# Patient Record
Sex: FEMALE | URBAN - METROPOLITAN AREA
[De-identification: names, ages, dates, MRNs, and addresses within clinical notes are randomized per-mention and may not be internally consistent; named-entity substitution may affect disease eponyms.]

---

## 2018-03-26 ENCOUNTER — APPOINTMENT (RX ONLY)
Dept: URBAN - METROPOLITAN AREA CLINIC 91 | Facility: CLINIC | Age: 73
Setting detail: DERMATOLOGY
End: 2018-03-26

## 2018-03-26 DIAGNOSIS — D485 NEOPLASM OF UNCERTAIN BEHAVIOR OF SKIN: ICD-10-CM

## 2018-03-26 PROBLEM — J30.1 ALLERGIC RHINITIS DUE TO POLLEN: Status: ACTIVE | Noted: 2018-03-26

## 2018-03-26 PROBLEM — D48.5 NEOPLASM OF UNCERTAIN BEHAVIOR OF SKIN: Status: ACTIVE | Noted: 2018-03-26

## 2018-03-26 PROCEDURE — 11100: CPT

## 2018-03-26 PROCEDURE — ? COUNSELING

## 2018-03-26 PROCEDURE — ? BIOPSY BY SHAVE METHOD

## 2018-03-26 ASSESSMENT — LOCATION SIMPLE DESCRIPTION DERM: LOCATION SIMPLE: LEFT BUTTOCK

## 2018-03-26 ASSESSMENT — PAIN INTENSITY VAS: HOW INTENSE IS YOUR PAIN 0 BEING NO PAIN, 10 BEING THE MOST SEVERE PAIN POSSIBLE?: NO PAIN

## 2018-03-26 ASSESSMENT — LOCATION ZONE DERM: LOCATION ZONE: TRUNK

## 2018-03-26 ASSESSMENT — LOCATION DETAILED DESCRIPTION DERM: LOCATION DETAILED: LEFT BUTTOCK

## 2019-06-24 ENCOUNTER — APPOINTMENT (RX ONLY)
Dept: URBAN - METROPOLITAN AREA CLINIC 91 | Facility: CLINIC | Age: 74
Setting detail: DERMATOLOGY
End: 2019-06-24

## 2019-06-24 DIAGNOSIS — L91.8 OTHER HYPERTROPHIC DISORDERS OF THE SKIN: ICD-10-CM

## 2019-06-24 DIAGNOSIS — L82.0 INFLAMED SEBORRHEIC KERATOSIS: ICD-10-CM

## 2019-06-24 PROCEDURE — 99213 OFFICE O/P EST LOW 20 MIN: CPT

## 2019-06-24 PROCEDURE — ? ADDITIONAL NOTES

## 2019-06-24 ASSESSMENT — LOCATION DETAILED DESCRIPTION DERM
LOCATION DETAILED: LEFT INFERIOR LATERAL FOREHEAD
LOCATION DETAILED: RIGHT LATERAL UPPER BACK

## 2019-06-24 ASSESSMENT — LOCATION ZONE DERM
LOCATION ZONE: FACE
LOCATION ZONE: TRUNK

## 2019-06-24 ASSESSMENT — LOCATION SIMPLE DESCRIPTION DERM
LOCATION SIMPLE: RIGHT UPPER BACK
LOCATION SIMPLE: LEFT FOREHEAD

## 2019-06-24 NOTE — PROCEDURE: ADDITIONAL NOTES
Additional Notes: Treated with cryotherapy
Detail Level: Simple
Additional Notes: Treated with electro needle

## 2022-07-05 ENCOUNTER — APPOINTMENT (RX ONLY)
Dept: URBAN - METROPOLITAN AREA CLINIC 117 | Facility: CLINIC | Age: 77
Setting detail: DERMATOLOGY
End: 2022-07-05

## 2022-07-05 DIAGNOSIS — L82.1 OTHER SEBORRHEIC KERATOSIS: ICD-10-CM

## 2022-07-05 DIAGNOSIS — L91.8 OTHER HYPERTROPHIC DISORDERS OF THE SKIN: ICD-10-CM

## 2022-07-05 PROCEDURE — 99202 OFFICE O/P NEW SF 15 MIN: CPT

## 2022-07-05 PROCEDURE — ? COUNSELING

## 2022-07-05 PROCEDURE — ? LIQUID NITROGEN

## 2022-07-05 PROCEDURE — ? INVENTORY

## 2022-07-05 ASSESSMENT — LOCATION DETAILED DESCRIPTION DERM
LOCATION DETAILED: RIGHT CLAVICULAR SKIN
LOCATION DETAILED: UPPER STERNUM
LOCATION DETAILED: RIGHT INFERIOR LATERAL NECK
LOCATION DETAILED: RIGHT CLAVICULAR NECK
LOCATION DETAILED: LEFT INFERIOR ANTERIOR NECK
LOCATION DETAILED: STERNAL NOTCH
LOCATION DETAILED: LEFT INFERIOR LATERAL NECK
LOCATION DETAILED: RIGHT LATERAL SUPERIOR CHEST

## 2022-07-05 ASSESSMENT — LOCATION ZONE DERM
LOCATION ZONE: NECK
LOCATION ZONE: TRUNK

## 2022-07-05 ASSESSMENT — LOCATION SIMPLE DESCRIPTION DERM
LOCATION SIMPLE: RIGHT CLAVICULAR SKIN
LOCATION SIMPLE: LEFT ANTERIOR NECK
LOCATION SIMPLE: CHEST
LOCATION SIMPLE: RIGHT ANTERIOR NECK

## 2022-07-05 NOTE — PROCEDURE: LIQUID NITROGEN
Medical Necessity Information: It is in your best interest to select a reason for this procedure from the list below. All of these items fulfill various CMS LCD requirements except the new and changing color options.
Medical Necessity Clause: This procedure was medically necessary because the lesions that were treated were:
Include Z78.9 (Other Specified Conditions Influencing Health Status) As An Associated Diagnosis?: No
Detail Level: Detailed
Spray Paint Text: The liquid nitrogen was applied to the skin utilizing a spray paint frosting technique.
Duration Of Freeze Thaw-Cycle (Seconds): 0
Consent: The patient's consent was obtained including but not limited to risks of crusting, scabbing, blistering, scarring, darker or lighter pigmentary change, recurrence, incomplete removal and infection.
Post-Care Instructions: I reviewed with the patient in detail post-care instructions. Patient is to wear sunprotection, and avoid picking at any of the treated lesions. Pt may apply Vaseline to crusted or scabbing areas.
Show Spray Paint Technique Variable?: Yes

## 2022-10-10 NOTE — PROCEDURE: BIOPSY BY SHAVE METHOD
Detail Level: Detailed
Hemostasis: Zia's
Lab Facility: [id number]
Silver Nitrate Text: The wound bed was treated with silver nitrate after the biopsy was performed.
Lab: [id number]
Notification Instructions: Patient will be notified of biopsy results. However, patient instructed to call the office if not contacted within 2 weeks.
Type Of Destruction Used: Curettage
Wound Care: Vaseline
Render Post-Care Instructions In Note?: yes
Curettage Text: The wound bed was treated with curettage after the biopsy was performed.
 used
Dressing: bandage
Size Of Lesion In Cm: 0
Biopsy Method: Dermablade
Destruction After The Procedure: No
Electrodesiccation And Curettage Text: The wound bed was treated with electrodesiccation and curettage after the biopsy was performed.
Consent: Verbal consent was obtained and risks were reviewed including but not limited to scarring, infection, bleeding, scabbing, incomplete removal, nerve damage and allergy to anesthesia.
Anesthesia Type: 1% lidocaine without epinephrine
Biopsy Type: H and E
Billing Type: Third-Party Bill
Post-Care Instructions: I reviewed with the patient in detail post-care instructions. Patient is to keep the biopsy site dry overnight, and then apply vaseline twice daily until healed. Patient may apply hydrogen peroxide soaks to remove any crusting.
Electrodesiccation Text: The wound bed was treated with electrodesiccation after the biopsy was performed.
Anesthesia Volume In Cc (Will Not Render If 0): 0.3
Cryotherapy Text: The wound bed was treated with cryotherapy after the biopsy was performed.

## 2023-01-22 ENCOUNTER — HOSPITAL ENCOUNTER (EMERGENCY)
Dept: HOSPITAL 92 - ERS | Age: 78
Discharge: HOME | End: 2023-01-22
Payer: MEDICARE

## 2023-01-22 DIAGNOSIS — I48.92: ICD-10-CM

## 2023-01-22 DIAGNOSIS — R00.2: Primary | ICD-10-CM

## 2023-01-22 DIAGNOSIS — Z79.82: ICD-10-CM

## 2023-01-22 DIAGNOSIS — Z79.01: ICD-10-CM

## 2023-01-22 DIAGNOSIS — I10: ICD-10-CM

## 2023-01-22 LAB
ALBUMIN SERPL BCG-MCNC: 4.5 G/DL (ref 3.4–4.8)
ALP SERPL-CCNC: 86 U/L (ref 40–110)
ALT SERPL W P-5'-P-CCNC: 27 U/L (ref 8–55)
ANION GAP SERPL CALC-SCNC: 16 MMOL/L (ref 10–20)
AST SERPL-CCNC: 23 U/L (ref 5–34)
BASOPHILS # BLD AUTO: 0 THOU/UL (ref 0–0.2)
BASOPHILS NFR BLD AUTO: 0.1 % (ref 0–1)
BILIRUB SERPL-MCNC: 0.7 MG/DL (ref 0.2–1.2)
BUN SERPL-MCNC: 23 MG/DL (ref 9.8–20.1)
CALCIUM SERPL-MCNC: 9.9 MG/DL (ref 7.8–10.44)
CHLORIDE SERPL-SCNC: 104 MMOL/L (ref 98–107)
CO2 SERPL-SCNC: 21 MMOL/L (ref 23–31)
CREAT CL PREDICTED SERPL C-G-VRATE: 0 ML/MIN (ref 70–130)
EOSINOPHIL # BLD AUTO: 0.1 THOU/UL (ref 0–0.7)
EOSINOPHIL NFR BLD AUTO: 0.9 % (ref 0–10)
GLOBULIN SER CALC-MCNC: 2.7 G/DL (ref 2.4–3.5)
GLUCOSE SERPL-MCNC: 130 MG/DL (ref 83–110)
HGB BLD-MCNC: 14.9 G/DL (ref 12–16)
LYMPHOCYTES # BLD: 1.8 THOU/UL (ref 1.2–3.4)
LYMPHOCYTES NFR BLD AUTO: 19 % (ref 21–51)
MCH RBC QN AUTO: 31.6 PG (ref 27–31)
MCV RBC AUTO: 89.4 FL (ref 78–98)
MONOCYTES # BLD AUTO: 0.4 THOU/UL (ref 0.11–0.59)
MONOCYTES NFR BLD AUTO: 4 % (ref 0–10)
NEUTROPHILS # BLD AUTO: 7.3 THOU/UL (ref 1.4–6.5)
NEUTROPHILS NFR BLD AUTO: 76 % (ref 42–75)
PLATELET # BLD AUTO: 222 10X3/UL (ref 130–400)
POTASSIUM SERPL-SCNC: 4.1 MMOL/L (ref 3.5–5.1)
RBC # BLD AUTO: 4.71 MILL/UL (ref 4.2–5.4)
SODIUM SERPL-SCNC: 137 MMOL/L (ref 136–145)
WBC # BLD AUTO: 9.6 10X3/UL (ref 4.8–10.8)

## 2023-01-22 PROCEDURE — 80053 COMPREHEN METABOLIC PANEL: CPT

## 2023-01-22 PROCEDURE — 71045 X-RAY EXAM CHEST 1 VIEW: CPT

## 2023-01-22 PROCEDURE — 84484 ASSAY OF TROPONIN QUANT: CPT

## 2023-01-22 PROCEDURE — 93005 ELECTROCARDIOGRAM TRACING: CPT

## 2023-01-22 PROCEDURE — 36415 COLL VENOUS BLD VENIPUNCTURE: CPT

## 2023-01-22 PROCEDURE — 96374 THER/PROPH/DIAG INJ IV PUSH: CPT

## 2023-01-22 PROCEDURE — 83880 ASSAY OF NATRIURETIC PEPTIDE: CPT

## 2023-01-22 PROCEDURE — 96375 TX/PRO/DX INJ NEW DRUG ADDON: CPT

## 2023-01-22 PROCEDURE — 85025 COMPLETE CBC W/AUTO DIFF WBC: CPT

## 2025-06-30 ENCOUNTER — HOSPITAL ENCOUNTER (EMERGENCY)
Dept: HOSPITAL 92 - ERS | Age: 80
Discharge: HOME | End: 2025-06-30
Payer: COMMERCIAL

## 2025-06-30 DIAGNOSIS — Z79.82: ICD-10-CM

## 2025-06-30 DIAGNOSIS — I48.91: Primary | ICD-10-CM

## 2025-06-30 DIAGNOSIS — I10: ICD-10-CM

## 2025-06-30 DIAGNOSIS — Z79.899: ICD-10-CM

## 2025-06-30 LAB
ALBUMIN SERPL BCG-MCNC: 4.1 G/DL (ref 3.1–4.5)
ALBUMIN/GLOB SERPL: 1.3 G/DL (ref 1.2–2.2)
ALP SERPL-CCNC: 129 U/L (ref 40–110)
ALT SERPL W P-5'-P-CCNC: 29 U/L (ref ?–34)
ANION GAP SERPL CALC-SCNC: 12 MMOL/L (ref 10–20)
ANISOCYTOSIS BLD QL SMEAR: (no result) (100X)
AST SERPL-CCNC: 35 U/L (ref 11–34)
AUER BODIES BLD QL SMEAR: (no result)
BASO STIPL BLD QL SMEAR: (no result) (100X)
BASOPHILS # BLD AUTO: 0.04 10X3/UL (ref 0–0.2)
BASOPHILS NFR BLD AUTO: 0.5 % (ref 0–1)
BASOPHILS NFR BLD MANUAL: (no result) % (ref 0–2)
BILIRUB SERPL-MCNC: 0.5 MG/DL (ref 0.3–1.2)
BITE CELLS BLD QL SMEAR: (no result) (100X)
BLASTS NFR BLD MANUAL: (no result) % (ref 0–0)
BLISTER CELLS BLD QL SMEAR: (no result) (100X)
BUN SERPL-MCNC: 16 MG/DL (ref 9.8–20.1)
BURR CELLS BLD QL SMEAR: (no result) (100X)
BURR CELLS BLD QL SMEAR: (no result) (100X)
CABOT RINGS BLD QL SMEAR: (no result) (100X)
CALCIUM SERPL-MCNC: 9.2 MG/DL (ref 7.8–10.44)
CELLAVISION OPERATOR ID: (no result)
CHLORIDE SERPL-SCNC: 109 MMOL/L (ref 98–107)
CO2 SERPL-SCNC: 25 MMOL/L (ref 23–31)
COMM CRITICAL RESULTS DOC: (no result)
CREAT CL PREDICTED SERPL C-G-VRATE: 0 ML/MIN (ref 70–130)
CREAT SERPL-MCNC: 0.71 MG/DL (ref 0.5–1.2)
DACRYOCYTES BLD QL SMEAR: (no result) (100X)
DELETE AUTO DIFF??: (no result)
DOHLE BOD BLD QL SMEAR: (no result)
EGFRCR SERPLBLD CKD-EPI 2021: 88 ML/MIN/{1.73_M2}
ELLIPTOCYTES BLD QL SMEAR: (no result) (100X)
EOSINOPHIL # BLD AUTO: 0.24 10X3/UL (ref 0–0.7)
EOSINOPHIL NFR BLD AUTO: 3.3 % (ref 0–10)
EOSINOPHIL NFR BLD MANUAL: (no result) % (ref 0–10)
ERYTHROCYTE [DISTWIDTH] IN BLOOD BY AUTOMATED COUNT: 13.1 % (ref 11.5–14.5)
GIANT PLATELETS BLD QL SMEAR: (no result) HPF (ref 0–5)
GLOBULIN SER CALC-MCNC: 3.1 G/DL (ref 2.4–3.5)
GLUCOSE SERPL-MCNC: 110 MG/DL (ref 83–110)
HCT VFR BLD CALC: 40.4 % (ref 36–47)
HELMET CELLS BLD QL SMEAR: (no result) (100X)
HGB BLD-MCNC: 13.8 G/DL (ref 12–16)
HOWELL-JOLLY BOD BLD QL SMEAR: (no result) (100X)
HYPOCHROMIA BLD QL SMEAR: (no result) (100X)
LG PLATELETS BLD QL SMEAR: (no result)
LYMPHOCYTES NFR BLD AUTO: 19.8 % (ref 21–51)
LYMPHOCYTES NFR BLD MANUAL: (no result) % (ref 21–51)
Lab: (no result) (100X)
MACROCYTES BLD QL SMEAR: (no result) (100X)
MANUAL DIF COMMENT BLD-IMP: (no result)
MANUAL DIF COMMENT BLD-IMP: (no result)
MANUAL DIFF??: (no result)
MCH RBC QN AUTO: 29.6 PG (ref 27–31)
MCHC RBC AUTO-ENTMCNC: 34.2 G/DL (ref 32–36)
MCV RBC AUTO: 86.5 FL (ref 78–98)
METAMYELOCYTES NFR BLD MANUAL: (no result) % (ref 0–0)
MICROCYTES BLD QL SMEAR: (no result) (100X)
MONOCYTES # BLD AUTO: 0.33 10X3/UL (ref 0.11–0.59)
MONOCYTES NFR BLD AUTO: 4.5 % (ref 0–10)
MONOCYTES NFR BLD MANUAL: (no result) % (ref 0–10)
MYELOCYTES NFR BLD MANUAL: (no result) % (ref 0–0)
NEUTROPHILS # BLD AUTO: 5.25 10X3/UL (ref 1.4–6.5)
NEUTROPHILS NFR BLD AUTO: 71.8 % (ref 42–75)
NEUTS BAND NFR BLD MANUAL: (no result) % (ref 5–11)
NEUTS HYPERSEG BLD QL SMEAR: (no result)
NEUTS SEG NFR BLD MANUAL: (no result) % (ref 42–75)
NRBC # BLD: (no result) %
OVALOCYTES BLD QL SMEAR: (no result) (100X)
PAPPENHEIMER BOD BLD QL SMEAR: (no result) (100X)
PLASMACOID LYMPHS NFR BLD MANUAL: (no result) % (ref 0–0)
PLATELET # BLD AUTO: 181 10X3/UL (ref 130–400)
PLATELET BLD QL SMEAR: (no result)
PLATELET CLUMP BLD QL SMEAR: (no result)
PLATELET SATEL BLD QL SMEAR: (no result)
PMV BLD AUTO: 8.9 FL (ref 7.4–10.4)
POIKILOCYTOSIS BLD QL SMEAR: (no result) (100X)
POLYCHROMASIA BLD QL SMEAR: (no result) (100X)
POTASSIUM SERPL-SCNC: 3.8 MMOL/L (ref 3.5–5.1)
PROMYELOCYTES NFR BLD MANUAL: (no result) % (ref 0–0)
PROT SERPL-MCNC: 7.2 G/DL (ref 5.8–8.1)
RBC # BLD AUTO: 4.67 MILL/UL (ref 4.2–5.4)
RBC MORPH BLD: (no result)
REFLEX FOR REVIEW??: (no result)
ROULEAUX BLD QL SMEAR: (no result) (100X)
SCHISTOCYTES BLD QL SMEAR: (no result) (100X)
SICKLE CELLS BLD QL SMEAR: (no result) (100X)
SMALL PLATELETS BLD QL SMEAR: (no result) HPF (ref 0–15)
SMUDGE CELLS BLD QL SMEAR: (no result)
SODIUM SERPL-SCNC: 142 MMOL/L (ref 136–145)
SPHEROCYTES BLD QL SMEAR: (no result) (100X)
STOMATOCYTES BLD QL SMEAR: (no result) (100X)
TARGETS BLD QL SMEAR: (no result) (100X)
TOTAL CELLS COUNTED BLD: (no result)
TOXIC GRANULES BLD QL SMEAR: (no result)
TROPONIN I SERPL DL<=0.01 NG/ML-MCNC: 0.01 NG/ML (ref ?–0.03)
VARIANT LYMPHS NFR BLD MANUAL: (no result) % (ref 0–10)
WBC # BLD AUTO: 7.32 10X3/UL (ref 4.8–10.8)
WBC OTHER NFR BLD MANUAL: (no result) %
WBC TOXIC VACUOLES BLD QL SMEAR: (no result)

## 2025-06-30 PROCEDURE — 85379 FIBRIN DEGRADATION QUANT: CPT

## 2025-06-30 PROCEDURE — 84484 ASSAY OF TROPONIN QUANT: CPT

## 2025-06-30 PROCEDURE — 83880 ASSAY OF NATRIURETIC PEPTIDE: CPT

## 2025-06-30 PROCEDURE — 85025 COMPLETE CBC W/AUTO DIFF WBC: CPT

## 2025-06-30 PROCEDURE — 96375 TX/PRO/DX INJ NEW DRUG ADDON: CPT

## 2025-06-30 PROCEDURE — 96374 THER/PROPH/DIAG INJ IV PUSH: CPT

## 2025-06-30 PROCEDURE — 84443 ASSAY THYROID STIM HORMONE: CPT

## 2025-06-30 PROCEDURE — 93005 ELECTROCARDIOGRAM TRACING: CPT

## 2025-06-30 PROCEDURE — 71045 X-RAY EXAM CHEST 1 VIEW: CPT

## 2025-06-30 PROCEDURE — 80053 COMPREHEN METABOLIC PANEL: CPT
